# Patient Record
(demographics unavailable — no encounter records)

---

## 2025-03-26 NOTE — PHYSICAL EXAM
[Normal Breath Sounds] : Normal breath sounds [Normal Heart Sounds] : normal heart sounds [Normal Rate and Rhythm] : normal rate and rhythm [No Rash or Lesion] : No rash or lesion [Alert] : alert [Oriented to Person] : oriented to person [Oriented to Place] : oriented to place [Oriented to Time] : oriented to time [Calm] : calm [Abdomen Masses] : No abdominal masses [Abdomen Tenderness] : ~T No ~M abdominal tenderness [JVD] : no jugular venous distention  [Wheezing] : no wheezing was heard [de-identified] : NAD [de-identified] : NC/AT [de-identified] : BRIAN, steady gait

## 2025-03-26 NOTE — HISTORY OF PRESENT ILLNESS
[FreeTextEntry1] : 42 y/o male with history of rectal bleeding presents today for initial consultation.  He recently noted a change in his bowel habits where he became constipated a few weeks ago and complains of pain and bleeding after defecation.  He notes difficulty defecating requiring straining and prolonged sitting, and the pain with defecation is described as "a cut".  He also notes a chronic issue with prolapsed tissue after defecation, which he manually reduces.   He had never had a colonoscopy.

## 2025-03-26 NOTE — ASSESSMENT
[FreeTextEntry1] : 41-year-old  male who presents with a chief complaint of anorectal discomfort and prolapsing tissue.  Patient noted a's change in his bowel habits requiring increasing straining and pushing.  He has been troubled with what he describes as hemorrhoids for years.  At this point he is actually manually reducing prolapsing tissue with each bowel movement.  He notes some increase in pain in the anal area of late.  He has never had a colonoscopy.  Inspection of the anorectal area reveals no external pathology.  On digital examination no suspicious mass is palpable.  Anoscopy was performed revealing 2 hypertrophied anal papillae and no evidence of a fissure.  He does have very sizable 3 quadrant internal hemorrhoids.  I explained to the patient that he should increase his daily fluid intake and consider using a daily fiber supplement.  I would attempt a trial of hemorrhoidal banding and see how his symptoms are at that point.  He will eventually need his hypertrophied anal papillae excised if they continue to prolapse.  I also would advise him to have a screening colonoscopy.  Occasionally hemorrhoids are too large for banding and might require excisional therapy.  We shall see how we does clinically after banding

## 2025-04-18 NOTE — HISTORY OF PRESENT ILLNESS
[FreeTextEntry1] : 41-year-old  male who presents for his second hemorrhoidal banding.  Patient  showed me a picture of his anal area after recent bowel movement.  He seems to still have significant prolapse.  We will attempt to complete banding and see how he does.  If this does not work he may require a hemorrhoidectomy.  The patient was examined in the left lateral decubitus position.  An anoscope was introduced and a sizable left lateral hemorrhoid was isolated.  A band was deployed without difficulty.  I will see him in 3 weeks for third banding.